# Patient Record
Sex: MALE | Race: WHITE | Employment: OTHER | URBAN - METROPOLITAN AREA
[De-identification: names, ages, dates, MRNs, and addresses within clinical notes are randomized per-mention and may not be internally consistent; named-entity substitution may affect disease eponyms.]

---

## 2017-06-22 ENCOUNTER — HOSPITAL ENCOUNTER (OUTPATIENT)
Age: 72
Setting detail: SPECIMEN
Discharge: HOME | End: 2017-06-22
Payer: MEDICARE

## 2017-06-26 LAB — DERMATOLOGY PATHOLOGY REPORT: NORMAL

## 2020-02-10 ENCOUNTER — HOSPITAL ENCOUNTER (OUTPATIENT)
Age: 75
Discharge: HOME | End: 2020-02-10
Payer: COMMERCIAL

## 2020-02-10 PROCEDURE — 93923 UPR/LXTR ART STDY 3+ LVLS: CPT

## 2020-02-28 ENCOUNTER — NURSE TRIAGE (OUTPATIENT)
Dept: OTHER | Facility: CLINIC | Age: 75
End: 2020-02-28

## 2020-11-01 ENCOUNTER — NURSE TRIAGE (OUTPATIENT)
Dept: OTHER | Facility: CLINIC | Age: 75
End: 2020-11-01

## 2020-11-01 NOTE — TELEPHONE ENCOUNTER
Reason for Disposition   [1] Rectal pain or fullness from fecal impaction (rectum full of stool) AND [2] NOT better after SITZ bath, suppository or enema    Answer Assessment - Initial Assessment Questions  1. STOOL PATTERN OR FREQUENCY: \"How often do you pass bowel movements (BMs)? \"  (Normal range: tid to q 3 days)  \"When was the last BM passed? \"        Every 2-3 days, last bowel was 6 days ago    2. STRAINING: \"Do you have to strain to have a BM? \"         Sometimes    3. RECTAL PAIN: \"Does your rectum hurt when the stool comes out? \" If so, ask: \"Do you have hemorrhoids? How bad is the pain? \"  (Scale 1-10; or mild, moderate, severe)       No    4. STOOL COMPOSITION: \"Are the stools hard? \"        Yes    5. BLOOD ON STOOLS: \"Has there been any blood on the toilet tissue or on the surface of the BM? \" If so, ask: \"When was the last time? \"        A little    6. CHRONIC CONSTIPATION: \"Is this a new problem for you? \"  If no, ask: \"How long have you had this problem? \" (days, weeks, months)       Chronic, a couple of years    7. CHANGES IN DIET: \"Have there been any recent changes in your diet? \"       No    8. MEDICATIONS: Javier Santoyo you been taking any new medications? \"      No    9. LAXATIVES: \"Have you been using any laxatives or enemas? \"  If yes, ask \"What, how often, and when was the last time? \"       Enema tonight    10. CAUSE: \"What do you think is causing the constipation? \"        Unsure,     11. OTHER SYMPTOMS: \"Do you have any other symptoms? \" (e.g., abdominal pain, fever, vomiting)        None      Caller stated he has a stool impaction, which has been present for 6 days. Caller stated he tried a fleets enema 30 minutes ago but was not successful. Caller was diagnosed with parkinson disease and diverticulosis. Caller  was informed to follow up with his PCP within 4 hours or be evaluated at the ED  and to call back should his symptoms worsen and agreed.     Protocols used: CONSTIPATION-ADULT-